# Patient Record
Sex: MALE | Race: WHITE | NOT HISPANIC OR LATINO | Employment: OTHER | RURAL
[De-identification: names, ages, dates, MRNs, and addresses within clinical notes are randomized per-mention and may not be internally consistent; named-entity substitution may affect disease eponyms.]

---

## 2020-06-29 ENCOUNTER — OFFICE VISIT (OUTPATIENT)
Dept: FAMILY MEDICINE CLINIC | Facility: CLINIC | Age: 35
End: 2020-06-29

## 2020-06-29 VITALS
HEIGHT: 73 IN | WEIGHT: 315 LBS | BODY MASS INDEX: 41.75 KG/M2 | OXYGEN SATURATION: 98 % | RESPIRATION RATE: 18 BRPM | TEMPERATURE: 97.5 F | HEART RATE: 86 BPM | DIASTOLIC BLOOD PRESSURE: 100 MMHG | SYSTOLIC BLOOD PRESSURE: 140 MMHG

## 2020-06-29 DIAGNOSIS — I10 ESSENTIAL HYPERTENSION: Primary | ICD-10-CM

## 2020-06-29 DIAGNOSIS — Z13.220 SCREENING FOR HYPERLIPIDEMIA: ICD-10-CM

## 2020-06-29 PROCEDURE — 99203 OFFICE O/P NEW LOW 30 MIN: CPT | Performed by: FAMILY MEDICINE

## 2020-06-29 RX ORDER — LOSARTAN POTASSIUM AND HYDROCHLOROTHIAZIDE 12.5; 5 MG/1; MG/1
1 TABLET ORAL 2 TIMES DAILY
Qty: 60 TABLET | Refills: 3 | Status: SHIPPED | OUTPATIENT
Start: 2020-06-29 | End: 2020-07-27 | Stop reason: SDUPTHER

## 2020-06-29 RX ORDER — LOSARTAN POTASSIUM AND HYDROCHLOROTHIAZIDE 12.5; 5 MG/1; MG/1
1 TABLET ORAL DAILY
COMMUNITY
Start: 2020-06-18 | End: 2020-06-29 | Stop reason: DRUGHIGH

## 2020-06-29 RX ORDER — AZITHROMYCIN 500 MG/1
500 TABLET, FILM COATED ORAL DAILY
COMMUNITY
Start: 2020-06-18 | End: 2020-07-27

## 2020-06-29 NOTE — ASSESSMENT & PLAN NOTE
Hypertension is worsening.  Dietary sodium restriction.  Weight loss.  Regular aerobic exercise.  Medication changes per orders.  Blood pressure will be reassessed in 4 weeks.

## 2020-06-29 NOTE — PROGRESS NOTES
Chief Complaint   Patient presents with   • Hypertension       History of Present Illness:  Subjective   Eliseo Louie is a 34 y.o. male.   Hypertension   This is a new problem. The current episode started more than 1 year ago. The problem is unchanged. The problem is controlled. Associated symptoms include anxiety, headaches and malaise/fatigue. Pertinent negatives include no blurred vision, chest pain, neck pain, orthopnea, palpitations, peripheral edema, PND, shortness of breath or sweats. (Patient states that he was having some migraines for 2 weeks and he states that they was getting to the point they was really bothering him and he states that he went to urgent care and he states that his blood pressure was 185/101 and he states that he was started on Losartan- HCTZ 50- 12.5. He states that after a couple days he started seeing a difference and he states that he has not had headches ever since. He states that this has saeed been an ongoing issue for some time. He states that he had this problem over a year ago and he states that he has gained a bunch of weight and he states that his pressure was getting out of control and he states that he started going to the gym and getting back into shape and he states that his pressure started going down. He states that he stopped taking his medication and he states that he is back at it now and he states that he has gained his weight back. ) There are no associated agents to hypertension. Risk factors for coronary artery disease include family history, male gender, obesity and stress. Past treatments include ACE inhibitors. Current antihypertension treatment includes ACE inhibitors. The current treatment provides mild improvement. There are no compliance problems.  There is no history of angina, kidney disease, CAD/MI, CVA, heart failure, left ventricular hypertrophy, PVD or retinopathy. There is no history of chronic renal disease, coarctation of the aorta,  hyperaldosteronism, hypercortisolism, hyperparathyroidism, a hypertension causing med, pheochromocytoma, renovascular disease, sleep apnea or a thyroid problem.        Allergies:  Allergies   Allergen Reactions   • Augmentin Es-600 [Amoxicillin-Pot Clavulanate] Anaphylaxis   • Ceclor [Cefaclor] Anaphylaxis     Duricef as well      • Duricef [Cefadroxil] Anaphylaxis   • Penicillins Anaphylaxis     Anything in the Cillin group          Social History:  Social History     Socioeconomic History   • Marital status:      Spouse name: Not on file   • Number of children: Not on file   • Years of education: Not on file   • Highest education level: Not on file   Tobacco Use   • Smoking status: Current Every Day Smoker     Types: Electronic Cigarette   • Smokeless tobacco: Never Used   Substance and Sexual Activity   • Alcohol use: Yes     Comment: occasional    • Drug use: Yes     Types: Marijuana     Comment: smokes daily        Family History:  Family History   Problem Relation Age of Onset   • Anxiety disorder Mother    • Depression Mother    • Hypertension Mother    • Hyperlipidemia Mother    • Cancer Mother         ovarian cancer in nremission   • Heart disease Father    • Hypertension Father    • Hyperlipidemia Father    • Cancer Father         lung cancer   • Heart disease Paternal Grandfather        Past Medical History :  Active Ambulatory Problems     Diagnosis Date Noted   • Essential hypertension 06/29/2020     Resolved Ambulatory Problems     Diagnosis Date Noted   • No Resolved Ambulatory Problems     Past Medical History:   Diagnosis Date   • Anxiety    • Hypertension    • Insomnia        Medication List:  Outpatient Encounter Medications as of 6/29/2020   Medication Sig Dispense Refill   • azithromycin (ZITHROMAX) 500 MG tablet Take 500 mg by mouth Daily.     • losartan-hydrochlorothiazide (HYZAAR) 50-12.5 MG per tablet Take 1 tablet by mouth 2 (two) times a day. 60 tablet 3   • [DISCONTINUED]  "losartan-hydrochlorothiazide (HYZAAR) 50-12.5 MG per tablet Take 1 tablet by mouth Daily.       No facility-administered encounter medications on file as of 6/29/2020.        Past Surgical History:  Past Surgical History:   Procedure Laterality Date   • FEMUR FRACTURE SURGERY      Left side Maurilio placed and 4 screws in knee and hip as well        The following portions of the patient's history were reviewed and updated as appropriate: allergies, current medications, past family history, past medical history, past social history, past surgical history and problem list.    Review Of Systems:  Review of Systems   Constitutional: Positive for malaise/fatigue. Negative for activity change, appetite change and fatigue.   HENT: Negative for ear discharge, ear pain, postnasal drip and rhinorrhea.    Eyes: Negative for blurred vision, double vision and discharge.   Respiratory: Negative for cough, chest tightness and shortness of breath.    Cardiovascular: Negative for chest pain, palpitations, orthopnea and PND.   Endocrine: Negative for cold intolerance and heat intolerance.   Musculoskeletal: Negative for back pain, gait problem, joint swelling and neck pain.   Skin: Negative for color change and rash.   Neurological: Negative for dizziness, facial asymmetry and confusion.   Psychiatric/Behavioral: Negative for behavioral problems.       Objective     Physical Exam:  Vital Signs:  Visit Vitals  /100   Pulse 86   Temp 97.5 °F (36.4 °C)   Resp 18   Ht 185.4 cm (73\")   Wt (!) 153 kg (336 lb 12.8 oz)   SpO2 98%   BMI 44.44 kg/m²       Physical Exam   Constitutional: He is oriented to person, place, and time. He appears well-developed and well-nourished.   HENT:   Head: Normocephalic.   Right Ear: External ear normal.   Left Ear: External ear normal.   Nose: Nose normal.   Eyes: Conjunctivae are normal.   Neck: Normal range of motion. Neck supple.   Cardiovascular: Normal rate and regular rhythm.   Pulmonary/Chest: Effort " normal and breath sounds normal.   Musculoskeletal: Normal range of motion.   Neurological: He is alert and oriented to person, place, and time.   Skin: Skin is warm and dry. Capillary refill takes less than 2 seconds.   Vitals reviewed.      Assessment/Plan   Assessment and Plan:  Diagnoses and all orders for this visit:    1. Essential hypertension (Primary)  Assessment & Plan:  Hypertension is worsening.  Dietary sodium restriction.  Weight loss.  Regular aerobic exercise.  Medication changes per orders.  Blood pressure will be reassessed in 4 weeks.    Orders:  -     losartan-hydrochlorothiazide (HYZAAR) 50-12.5 MG per tablet; Take 1 tablet by mouth 2 (two) times a day.  Dispense: 60 tablet; Refill: 3  -     CBC & Differential  -     Comprehensive Metabolic Panel  -     TSH    2. Screening for hyperlipidemia  -     Lipid Panel With / Chol / HDL Ratio

## 2020-06-30 LAB
ALBUMIN SERPL-MCNC: 4.4 G/DL (ref 4–5)
ALBUMIN/GLOB SERPL: 1.7 {RATIO} (ref 1.2–2.2)
ALP SERPL-CCNC: 86 IU/L (ref 39–117)
ALT SERPL-CCNC: 30 IU/L (ref 0–44)
AST SERPL-CCNC: 26 IU/L (ref 0–40)
BASOPHILS # BLD AUTO: 0 X10E3/UL (ref 0–0.2)
BASOPHILS NFR BLD AUTO: 1 %
BILIRUB SERPL-MCNC: 0.2 MG/DL (ref 0–1.2)
BUN SERPL-MCNC: 12 MG/DL (ref 6–20)
BUN/CREAT SERPL: 15 (ref 9–20)
CALCIUM SERPL-MCNC: 9.4 MG/DL (ref 8.7–10.2)
CHLORIDE SERPL-SCNC: 104 MMOL/L (ref 96–106)
CHOLEST SERPL-MCNC: 202 MG/DL (ref 100–199)
CHOLEST/HDLC SERPL: 5.1 RATIO (ref 0–5)
CO2 SERPL-SCNC: 28 MMOL/L (ref 20–29)
CREAT SERPL-MCNC: 0.79 MG/DL (ref 0.76–1.27)
EOSINOPHIL # BLD AUTO: 0.2 X10E3/UL (ref 0–0.4)
EOSINOPHIL NFR BLD AUTO: 4 %
ERYTHROCYTE [DISTWIDTH] IN BLOOD BY AUTOMATED COUNT: 14 % (ref 11.6–15.4)
GLOBULIN SER CALC-MCNC: 2.6 G/DL (ref 1.5–4.5)
GLUCOSE SERPL-MCNC: 97 MG/DL (ref 65–99)
HCT VFR BLD AUTO: 40.1 % (ref 37.5–51)
HDLC SERPL-MCNC: 40 MG/DL
HGB BLD-MCNC: 12.9 G/DL (ref 13–17.7)
IMM GRANULOCYTES # BLD AUTO: 0 X10E3/UL (ref 0–0.1)
IMM GRANULOCYTES NFR BLD AUTO: 0 %
LDLC SERPL CALC-MCNC: 130 MG/DL (ref 0–99)
LYMPHOCYTES # BLD AUTO: 2.1 X10E3/UL (ref 0.7–3.1)
LYMPHOCYTES NFR BLD AUTO: 33 %
MCH RBC QN AUTO: 28.1 PG (ref 26.6–33)
MCHC RBC AUTO-ENTMCNC: 32.2 G/DL (ref 31.5–35.7)
MCV RBC AUTO: 87 FL (ref 79–97)
MONOCYTES # BLD AUTO: 0.4 X10E3/UL (ref 0.1–0.9)
MONOCYTES NFR BLD AUTO: 6 %
NEUTROPHILS # BLD AUTO: 3.7 X10E3/UL (ref 1.4–7)
NEUTROPHILS NFR BLD AUTO: 56 %
PLATELET # BLD AUTO: 234 X10E3/UL (ref 150–450)
POTASSIUM SERPL-SCNC: 4.4 MMOL/L (ref 3.5–5.2)
PROT SERPL-MCNC: 7 G/DL (ref 6–8.5)
RBC # BLD AUTO: 4.59 X10E6/UL (ref 4.14–5.8)
SODIUM SERPL-SCNC: 141 MMOL/L (ref 134–144)
TRIGL SERPL-MCNC: 161 MG/DL (ref 0–149)
TSH SERPL DL<=0.005 MIU/L-ACNC: 2.66 UIU/ML (ref 0.45–4.5)
VLDLC SERPL CALC-MCNC: 32 MG/DL (ref 5–40)
WBC # BLD AUTO: 6.5 X10E3/UL (ref 3.4–10.8)

## 2020-07-27 ENCOUNTER — OFFICE VISIT (OUTPATIENT)
Dept: FAMILY MEDICINE CLINIC | Facility: CLINIC | Age: 35
End: 2020-07-27

## 2020-07-27 VITALS
TEMPERATURE: 98 F | WEIGHT: 315 LBS | BODY MASS INDEX: 41.75 KG/M2 | OXYGEN SATURATION: 98 % | HEART RATE: 91 BPM | RESPIRATION RATE: 18 BRPM | DIASTOLIC BLOOD PRESSURE: 82 MMHG | HEIGHT: 73 IN | SYSTOLIC BLOOD PRESSURE: 128 MMHG

## 2020-07-27 DIAGNOSIS — I10 ESSENTIAL HYPERTENSION: Primary | ICD-10-CM

## 2020-07-27 PROCEDURE — 99213 OFFICE O/P EST LOW 20 MIN: CPT | Performed by: FAMILY MEDICINE

## 2020-07-27 RX ORDER — LOSARTAN POTASSIUM AND HYDROCHLOROTHIAZIDE 12.5; 5 MG/1; MG/1
1 TABLET ORAL 2 TIMES DAILY
Qty: 180 TABLET | Refills: 3 | Status: SHIPPED | OUTPATIENT
Start: 2020-07-27 | End: 2021-10-25

## 2020-07-27 NOTE — PROGRESS NOTES
Chief Complaint   Patient presents with   • Hypertension     follow-up       History of Present Illness:  Subjective   Eliseo Louie is a 34 y.o. male.   Hypertension   This is a new problem. The current episode started more than 1 year ago. The problem is unchanged. The problem is controlled. Associated symptoms include anxiety, headaches and malaise/fatigue. Pertinent negatives include no blurred vision, chest pain, neck pain, orthopnea, palpitations, peripheral edema, PND, shortness of breath or sweats. There are no associated agents to hypertension. Risk factors for coronary artery disease include family history, male gender, obesity and stress. Past treatments include ACE inhibitors. Current antihypertension treatment includes ACE inhibitors. The current treatment provides mild improvement. There are no compliance problems.  There is no history of angina, kidney disease, CAD/MI, CVA, heart failure, left ventricular hypertrophy, PVD or retinopathy. There is no history of chronic renal disease, coarctation of the aorta, hyperaldosteronism, hypercortisolism, hyperparathyroidism, a hypertension causing med, pheochromocytoma, renovascular disease, sleep apnea or a thyroid problem.   7/27/20:  Patient is here today for a follow up for his BP. He has been taking his medication regularly and reports he hasn't had any headaches recently. He denies any complaints or side effects. He was told during his last visit to start taking his medication twice a day. He states he feels like this is working well for him.     Lab Review:   Patient is also here to follow-up on labs that were drawn on 6/29/20.   Recent Results (from the past 1344 hour(s))   CBC & Differential    Collection Time: 06/29/20 10:37 AM   Result Value Ref Range    WBC 6.5 3.4 - 10.8 x10E3/uL    RBC 4.59 4.14 - 5.80 x10E6/uL    Hemoglobin 12.9 (L) 13.0 - 17.7 g/dL    Hematocrit 40.1 37.5 - 51.0 %    MCV 87 79 - 97 fL    MCH 28.1 26.6 - 33.0 pg    MCHC 32.2  31.5 - 35.7 g/dL    RDW 14.0 11.6 - 15.4 %    Platelets 234 150 - 450 x10E3/uL    Neutrophil Rel % 56 Not Estab. %    Lymphocyte Rel % 33 Not Estab. %    Monocyte Rel % 6 Not Estab. %    Eosinophil Rel % 4 Not Estab. %    Basophil Rel % 1 Not Estab. %    Neutrophils Absolute 3.7 1.4 - 7.0 x10E3/uL    Lymphocytes Absolute 2.1 0.7 - 3.1 x10E3/uL    Monocytes Absolute 0.4 0.1 - 0.9 x10E3/uL    Eosinophils Absolute 0.2 0.0 - 0.4 x10E3/uL    Basophils Absolute 0.0 0.0 - 0.2 x10E3/uL    Immature Granulocyte Rel % 0 Not Estab. %    Immature Grans Absolute 0.0 0.0 - 0.1 x10E3/uL   Comprehensive Metabolic Panel    Collection Time: 06/29/20 10:37 AM   Result Value Ref Range    Glucose 97 65 - 99 mg/dL    BUN 12 6 - 20 mg/dL    Creatinine 0.79 0.76 - 1.27 mg/dL    eGFR Non African Am 116 >59 mL/min/1.73    eGFR African Am 134 >59 mL/min/1.73    BUN/Creatinine Ratio 15 9 - 20    Sodium 141 134 - 144 mmol/L    Potassium 4.4 3.5 - 5.2 mmol/L    Chloride 104 96 - 106 mmol/L    Total CO2 28 20 - 29 mmol/L    Calcium 9.4 8.7 - 10.2 mg/dL    Total Protein 7.0 6.0 - 8.5 g/dL    Albumin 4.4 4.0 - 5.0 g/dL    Globulin 2.6 1.5 - 4.5 g/dL    A/G Ratio 1.7 1.2 - 2.2    Total Bilirubin 0.2 0.0 - 1.2 mg/dL    Alkaline Phosphatase 86 39 - 117 IU/L    AST (SGOT) 26 0 - 40 IU/L    ALT (SGPT) 30 0 - 44 IU/L   Lipid Panel With / Chol / HDL Ratio    Collection Time: 06/29/20 10:37 AM   Result Value Ref Range    Total Cholesterol 202 (H) 100 - 199 mg/dL    Triglycerides 161 (H) 0 - 149 mg/dL    HDL Cholesterol 40 >39 mg/dL    VLDL Cholesterol 32 5 - 40 mg/dL    LDL Cholesterol  130 (H) 0 - 99 mg/dL    Chol/HDL Ratio 5.1 (H) 0.0 - 5.0 ratio   TSH    Collection Time: 06/29/20 10:37 AM   Result Value Ref Range    TSH 2.660 0.450 - 4.500 uIU/mL         Allergies:  Allergies   Allergen Reactions   • Augmentin Es-600 [Amoxicillin-Pot Clavulanate] Anaphylaxis   • Ceclor [Cefaclor] Anaphylaxis     Duricef as well      • Duricef [Cefadroxil] Anaphylaxis   •  Penicillins Anaphylaxis     Anything in the Cillin group          Social History:  Social History     Socioeconomic History   • Marital status:      Spouse name: Not on file   • Number of children: Not on file   • Years of education: Not on file   • Highest education level: Not on file   Tobacco Use   • Smoking status: Current Every Day Smoker     Types: Electronic Cigarette   • Smokeless tobacco: Never Used   Substance and Sexual Activity   • Alcohol use: Yes     Comment: occasional    • Drug use: Yes     Types: Marijuana     Comment: smokes daily        Family History:  Family History   Problem Relation Age of Onset   • Anxiety disorder Mother    • Depression Mother    • Hypertension Mother    • Hyperlipidemia Mother    • Cancer Mother         ovarian cancer in nremission   • Heart disease Father    • Hypertension Father    • Hyperlipidemia Father    • Cancer Father         lung cancer   • Heart disease Paternal Grandfather        Past Medical History :  Active Ambulatory Problems     Diagnosis Date Noted   • Essential hypertension 06/29/2020     Resolved Ambulatory Problems     Diagnosis Date Noted   • No Resolved Ambulatory Problems     Past Medical History:   Diagnosis Date   • Anxiety    • Hypertension    • Insomnia        Medication List:  Outpatient Encounter Medications as of 7/27/2020   Medication Sig Dispense Refill   • losartan-hydrochlorothiazide (HYZAAR) 50-12.5 MG per tablet Take 1 tablet by mouth 2 (two) times a day. 180 tablet 3   • [DISCONTINUED] losartan-hydrochlorothiazide (HYZAAR) 50-12.5 MG per tablet Take 1 tablet by mouth 2 (two) times a day. 60 tablet 3   • [DISCONTINUED] azithromycin (ZITHROMAX) 500 MG tablet Take 500 mg by mouth Daily.       No facility-administered encounter medications on file as of 7/27/2020.        Past Surgical History:  Past Surgical History:   Procedure Laterality Date   • FEMUR FRACTURE SURGERY      Left side Maurilio placed and 4 screws in knee and hip as well  "       The following portions of the patient's history were reviewed and updated as appropriate: allergies, current medications, past family history, past medical history, past social history, past surgical history and problem list.    Review Of Systems:  Review of Systems   Constitutional: Positive for malaise/fatigue. Negative for activity change, appetite change and fatigue.   HENT: Negative for ear discharge, ear pain, postnasal drip and rhinorrhea.    Eyes: Negative for blurred vision, double vision and discharge.   Respiratory: Negative for cough, chest tightness and shortness of breath.    Cardiovascular: Negative for chest pain, palpitations, orthopnea and PND.   Endocrine: Negative for cold intolerance and heat intolerance.   Musculoskeletal: Negative for back pain, gait problem, joint swelling and neck pain.   Skin: Negative for color change and rash.   Neurological: Negative for dizziness, facial asymmetry and confusion.   Psychiatric/Behavioral: Negative for behavioral problems.       Objective     Physical Exam:  Vital Signs:  Visit Vitals  /82   Pulse 91   Temp 98 °F (36.7 °C)   Resp 18   Ht 185.4 cm (73\")   Wt (!) 153 kg (337 lb)   SpO2 98%   BMI 44.46 kg/m²       Physical Exam   Constitutional: He is oriented to person, place, and time. He appears well-developed and well-nourished.   HENT:   Head: Normocephalic.   Right Ear: External ear normal.   Left Ear: External ear normal.   Nose: Nose normal.   Eyes: Conjunctivae are normal.   Neck: Normal range of motion. Neck supple.   Cardiovascular: Normal rate and regular rhythm.   Pulmonary/Chest: Effort normal and breath sounds normal.   Musculoskeletal: Normal range of motion.   Neurological: He is alert and oriented to person, place, and time.   Skin: Skin is warm and dry. Capillary refill takes less than 2 seconds.   Vitals reviewed.      Assessment/Plan   Assessment and Plan:  Diagnoses and all orders for this visit:    1. Essential " hypertension (Primary)  Assessment & Plan:  Hypertension is improving with treatment.  Continue current treatment regimen.  Blood pressure will be reassessed 3-4 months..  Refill sent for 90 days with 1 refill.     Orders:  -     losartan-hydrochlorothiazide (HYZAAR) 50-12.5 MG per tablet; Take 1 tablet by mouth 2 (two) times a day.  Dispense: 180 tablet; Refill: 3

## 2020-07-27 NOTE — ASSESSMENT & PLAN NOTE
Hypertension is improving with treatment.  Continue current treatment regimen.  Blood pressure will be reassessed 3-4 months..  Refill was sent for 90 days with 1 refill.

## 2020-09-11 ENCOUNTER — OFFICE VISIT (OUTPATIENT)
Dept: FAMILY MEDICINE CLINIC | Facility: CLINIC | Age: 35
End: 2020-09-11

## 2020-09-11 VITALS
OXYGEN SATURATION: 97 % | TEMPERATURE: 98 F | WEIGHT: 315 LBS | DIASTOLIC BLOOD PRESSURE: 97 MMHG | BODY MASS INDEX: 44.1 KG/M2 | RESPIRATION RATE: 18 BRPM | SYSTOLIC BLOOD PRESSURE: 140 MMHG | HEIGHT: 71 IN | HEART RATE: 90 BPM

## 2020-09-11 DIAGNOSIS — R68.82 DECREASED SEX DRIVE: ICD-10-CM

## 2020-09-11 DIAGNOSIS — F33.0 MILD EPISODE OF RECURRENT MAJOR DEPRESSIVE DISORDER (HCC): Primary | ICD-10-CM

## 2020-09-11 DIAGNOSIS — R53.83 OTHER FATIGUE: ICD-10-CM

## 2020-09-11 PROCEDURE — 99214 OFFICE O/P EST MOD 30 MIN: CPT | Performed by: FAMILY MEDICINE

## 2020-09-11 NOTE — PROGRESS NOTES
"Chief Complaint   Patient presents with   • Depression       History of Present Illness:  Subjective   Eliseo Louie is a 34 y.o. male.   Depression   Visit Type: initial  Onset of symptoms: more than 1 year ago  Progression since onset: gradually worsening  Patient presents with the following symptoms: decreased concentration, depressed mood, excessive worry, fatigue, feelings of hopelessness, feelings of worthlessness, irritability, malaise, nervousness/anxiety, obsessions, thoughts of death and weight gain.  Patient is not experiencing: confusion and shortness of breath.  Frequency of symptoms: constantly   Severity: moderate   Aggravated by: social activities and work stress  Sleep quality: fair  Risk factors: family history and major life event  Treatment tried: nothing       Patient reports he is here at the request of his wife and his mother to discuss his depression. He reports that he has mood swings and lashes out over little things he knows he has no control over. He has recently lost 3 friends in the past year and a half. 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 He plans to take a trip at the end of the month to take some time for himself.   He would like to talk about starting something low dose to help with his depression.     Lack of Desire:   Patient also reports lack of sexual desire. He reports he could care less about that aspect of his relationship and its taking a bit of a toll on his marriage. He also reports feeling \"blah\" and fatigued- even though he sleeps 8 hours a night.     Allergies:  Allergies   Allergen Reactions   • Augmentin Es-600 [Amoxicillin-Pot Clavulanate] Anaphylaxis   • Ceclor [Cefaclor] Anaphylaxis     Duricef as well      • Duricef [Cefadroxil] Anaphylaxis   • Penicillins Anaphylaxis     Anything in the Cillin group          Social History:  Social History "     Socioeconomic History   • Marital status:      Spouse name: Not on file   • Number of children: Not on file   • Years of education: Not on file   • Highest education level: Not on file   Tobacco Use   • Smoking status: Current Every Day Smoker     Types: Electronic Cigarette   • Smokeless tobacco: Never Used   Substance and Sexual Activity   • Alcohol use: Yes     Comment: occasional    • Drug use: Yes     Types: Marijuana     Comment: smokes daily        Family History:  Family History   Problem Relation Age of Onset   • Anxiety disorder Mother    • Depression Mother    • Hypertension Mother    • Hyperlipidemia Mother    • Cancer Mother         ovarian cancer in nremission   • Heart disease Father    • Hypertension Father    • Hyperlipidemia Father    • Cancer Father         lung cancer   • Heart disease Paternal Grandfather        Past Medical History :  Active Ambulatory Problems     Diagnosis Date Noted   • Essential hypertension 06/29/2020   • Mild episode of recurrent major depressive disorder (CMS/HCC) 09/11/2020   • Decreased sex drive 09/11/2020   • Other fatigue 09/11/2020     Resolved Ambulatory Problems     Diagnosis Date Noted   • No Resolved Ambulatory Problems     Past Medical History:   Diagnosis Date   • Anxiety    • Hypertension    • Insomnia        Medication List:  Outpatient Encounter Medications as of 9/11/2020   Medication Sig Dispense Refill   • losartan-hydrochlorothiazide (HYZAAR) 50-12.5 MG per tablet Take 1 tablet by mouth 2 (two) times a day. 180 tablet 3   • Vortioxetine HBr (Trintellix) 10 MG tablet Take 10 mg by mouth Daily. 30 tablet 2     No facility-administered encounter medications on file as of 9/11/2020.        Past Surgical History:  Past Surgical History:   Procedure Laterality Date   • FEMUR FRACTURE SURGERY      Left side Maurilio placed and 4 screws in knee and hip as well        The following portions of the patient's history were reviewed and updated as  "appropriate: allergies, current medications, past family history, past medical history, past social history, past surgical history and problem list.    Review Of Systems:  Review of Systems   Constitutional: Positive for irritability and unexpected weight gain. Negative for activity change, appetite change and fatigue.   HENT: Negative for ear discharge, ear pain, postnasal drip and rhinorrhea.    Eyes: Negative for double vision and discharge.   Respiratory: Negative for cough, chest tightness and shortness of breath.    Cardiovascular: Negative for chest pain.   Endocrine: Negative for cold intolerance and heat intolerance.   Genitourinary: Positive for decreased libido.   Musculoskeletal: Negative for back pain, gait problem and joint swelling.   Skin: Negative for color change and rash.   Neurological: Negative for dizziness, facial asymmetry and confusion.   Psychiatric/Behavioral: Positive for decreased concentration and depressed mood. Negative for behavioral problems. The patient is nervous/anxious.        Objective     Physical Exam:  Vital Signs:  Visit Vitals  /97   Pulse 90   Temp 98 °F (36.7 °C)   Resp 18   Ht 180.3 cm (71\")   Wt (!) 152 kg (335 lb)   SpO2 97%   BMI 46.72 kg/m²       Physical Exam   Constitutional: He is oriented to person, place, and time. He appears well-developed and well-nourished.   HENT:   Head: Normocephalic.   Right Ear: External ear normal.   Left Ear: External ear normal.   Nose: Nose normal.   Eyes: Conjunctivae are normal.   Neck: Normal range of motion. Neck supple.   Cardiovascular: Normal rate and regular rhythm.   Pulmonary/Chest: Effort normal and breath sounds normal.   Musculoskeletal: Normal range of motion.   Neurological: He is alert and oriented to person, place, and time.   Skin: Skin is warm and dry. Capillary refill takes less than 2 seconds.   Vitals reviewed.      Assessment/Plan   Assessment and Plan:  Diagnoses and all orders for this visit:    1. " Mild episode of recurrent major depressive disorder (CMS/HCC) (Primary)  Assessment & Plan:  Psychological condition is worsening.  Continue current treatment regimen.  Psychological condition  will be reassessed in 4 weeks.  Patient was started on Trintellix.    Orders:  -     Vortioxetine HBr (Trintellix) 10 MG tablet; Take 10 mg by mouth Daily.  Dispense: 30 tablet; Refill: 2    2. Decreased sex drive  Assessment & Plan:  Blood work obtained to determine possible cause.    Orders:  -     Testosterone, Free, Total    3. Other fatigue  -     Vitamin D 25 Hydroxy  -     Vitamin B12  -     Folate

## 2020-09-11 NOTE — ASSESSMENT & PLAN NOTE
Psychological condition is worsening.  Continue current treatment regimen.  Psychological condition  will be reassessed in 4 weeks.  Patient was started on Trintellix.

## 2020-09-14 ENCOUNTER — TELEPHONE (OUTPATIENT)
Dept: FAMILY MEDICINE CLINIC | Facility: CLINIC | Age: 35
End: 2020-09-14

## 2020-09-14 NOTE — TELEPHONE ENCOUNTER
Patient called the office and left a voicemail- he went to  his prescription for Trintellix and it was $350.   I have spoke to him and advised that he can call [employer]- Help at hand. And gave him the number.   This program helps people be able to afford Trintellix at a very low cost.     In the event he completes the process and this isn't helpful- to lower the cost- we can talk with Dr Rivera about trying something different.   The benefit of this medication is that it has very low side effects of decreasing sexual desire-which he is already having a problem with.     I asked if he went and got his labs done- which he did. We are still awaiting those results.

## 2020-09-15 LAB
25(OH)D3+25(OH)D2 SERPL-MCNC: 25.4 NG/ML (ref 30–100)
FOLATE SERPL-MCNC: 4 NG/ML
TESTOST FREE SERPL-MCNC: 7 PG/ML (ref 8.7–25.1)
TESTOST SERPL-MCNC: 405 NG/DL (ref 264–916)
VIT B12 SERPL-MCNC: 624 PG/ML (ref 232–1245)

## 2020-09-23 ENCOUNTER — TELEPHONE (OUTPATIENT)
Dept: FAMILY MEDICINE CLINIC | Facility: CLINIC | Age: 35
End: 2020-09-23

## 2020-09-23 NOTE — TELEPHONE ENCOUNTER
Patient called and asked about his medicatin.   Dalila faxed paperwork on 9/16 and left him a voicemail letting him know he would have to contact [employer]- (Help at hand)     I called him today and left him a voicemail letting him know this information- if he needs to, he can call me back.     -Courtney

## 2020-10-12 ENCOUNTER — TELEPHONE (OUTPATIENT)
Dept: FAMILY MEDICINE CLINIC | Facility: CLINIC | Age: 35
End: 2020-10-12

## 2020-11-23 ENCOUNTER — TELEPHONE (OUTPATIENT)
Dept: FAMILY MEDICINE CLINIC | Facility: CLINIC | Age: 35
End: 2020-11-23

## 2021-10-23 DIAGNOSIS — I10 ESSENTIAL HYPERTENSION: ICD-10-CM

## 2021-10-25 RX ORDER — LOSARTAN POTASSIUM AND HYDROCHLOROTHIAZIDE 12.5; 5 MG/1; MG/1
TABLET ORAL
Qty: 180 TABLET | Refills: 3 | Status: SHIPPED | OUTPATIENT
Start: 2021-10-25 | End: 2022-01-24 | Stop reason: SDUPTHER

## 2022-01-24 DIAGNOSIS — I10 ESSENTIAL HYPERTENSION: ICD-10-CM

## 2022-01-24 RX ORDER — LOSARTAN POTASSIUM AND HYDROCHLOROTHIAZIDE 12.5; 5 MG/1; MG/1
1 TABLET ORAL 2 TIMES DAILY
Qty: 180 TABLET | Refills: 3 | Status: SHIPPED | OUTPATIENT
Start: 2022-01-24

## 2022-01-24 NOTE — TELEPHONE ENCOUNTER
Caller: Eliseo Louie    Relationship: Self    Best call back number: [phone]     Requested Prescriptions:   Requested Prescriptions     Pending Prescriptions Disp Refills   • losartan-hydrochlorothiazide (HYZAAR) 50-12.5 MG per tablet 180 tablet 3     Sig: Take 1 tablet by mouth 2 (Two) Times a Day.        Pharmacy where request should be sent: [facility] [address] [place] [address] [id number] [facility] [address]  - [phone] [facility] [phone] FX     Additional details provided by patient: PATIENT IS NO LONGER USING [facility], DUE TO BEING CLOSED FOR UNDERSTAFFING    Does the patient have less than a 3 day supply:  [x] Yes  [] No    Magnolia Alvarez Rep   01/24/22 10:16 EST

## 2023-06-16 DIAGNOSIS — I10 ESSENTIAL HYPERTENSION: ICD-10-CM

## 2023-06-16 RX ORDER — LOSARTAN POTASSIUM AND HYDROCHLOROTHIAZIDE 12.5; 5 MG/1; MG/1
TABLET ORAL
Qty: 180 TABLET | Refills: 0 | OUTPATIENT
Start: 2023-06-16

## 2023-06-16 NOTE — TELEPHONE ENCOUNTER
Dr Rivera is no longer at this office. Please schedule an appointment with new PCP. Advised patient to go to Urgent care or [facility] to be seen before he can get in to see a doctor her.

## 2023-07-17 PROBLEM — F33.0 MILD EPISODE OF RECURRENT MAJOR DEPRESSIVE DISORDER: Status: RESOLVED | Noted: 2020-09-11 | Resolved: 2023-07-17

## 2024-07-27 DIAGNOSIS — I10 ESSENTIAL HYPERTENSION: ICD-10-CM

## 2024-07-29 RX ORDER — LOSARTAN POTASSIUM AND HYDROCHLOROTHIAZIDE 12.5; 5 MG/1; MG/1
1 TABLET ORAL DAILY
Qty: 90 TABLET | Refills: 0 | OUTPATIENT
Start: 2024-07-29

## 2024-08-01 DIAGNOSIS — I10 ESSENTIAL HYPERTENSION: ICD-10-CM

## 2024-08-01 RX ORDER — LOSARTAN POTASSIUM AND HYDROCHLOROTHIAZIDE 12.5; 5 MG/1; MG/1
1 TABLET ORAL DAILY
Qty: 90 TABLET | Refills: 3 | Status: SHIPPED | OUTPATIENT
Start: 2024-08-01

## 2024-08-01 NOTE — TELEPHONE ENCOUNTER
Caller: Eliseo Louie    Relationship: Self    Best call back number:     [phone] (Home)       Requested Prescriptions:   losartan-hydrochlorothiazide (HYZAAR) 50-12.5 MG per tablet        Pharmacy where request should be sent: [facility] [address] [place]  [address] - [phone]  - [phone]      Last office visit with prescribing clinician: 7/17/2023   Last telemedicine visit with prescribing clinician: Visit date not found   Next office visit with prescribing clinician: 8/6/2024     Additional details provided by patient: PATIENT CALLED TO REQUEST A MEDICATION REFILL ON MEDICATION. PATIENT HAS A 2 DAY SUPPLY LEFT.      Does the patient have less than a 3 day supply:  [x] Yes  [] No    Would you like a call back once the refill request has been completed: [] Yes [] No    If the office needs to give you a call back, can they leave a voicemail: [] Yes [] No    [employer] Rep   08/01/24 09:36 EDT         Thanks

## 2024-09-06 ENCOUNTER — OFFICE VISIT (OUTPATIENT)
Dept: FAMILY MEDICINE CLINIC | Facility: CLINIC | Age: 39
End: 2024-09-06
Payer: COMMERCIAL

## 2024-09-06 VITALS
DIASTOLIC BLOOD PRESSURE: 98 MMHG | TEMPERATURE: 97.3 F | BODY MASS INDEX: 44.1 KG/M2 | HEIGHT: 71 IN | WEIGHT: 315 LBS | SYSTOLIC BLOOD PRESSURE: 146 MMHG | RESPIRATION RATE: 16 BRPM | HEART RATE: 97 BPM | OXYGEN SATURATION: 99 %

## 2024-09-06 DIAGNOSIS — T88.7XXA MEDICATION SIDE EFFECT: ICD-10-CM

## 2024-09-06 DIAGNOSIS — Z11.59 ENCOUNTER FOR HEPATITIS C SCREENING TEST FOR LOW RISK PATIENT: ICD-10-CM

## 2024-09-06 DIAGNOSIS — E55.9 VITAMIN D DEFICIENCY: ICD-10-CM

## 2024-09-06 DIAGNOSIS — R06.83 SNORING: ICD-10-CM

## 2024-09-06 DIAGNOSIS — E78.2 MIXED HYPERLIPIDEMIA: ICD-10-CM

## 2024-09-06 DIAGNOSIS — Z00.00 ANNUAL PHYSICAL EXAM: Primary | ICD-10-CM

## 2024-09-06 DIAGNOSIS — Z72.89 CURRENT EVERY DAY VAPING: ICD-10-CM

## 2024-09-06 DIAGNOSIS — I10 ESSENTIAL HYPERTENSION: ICD-10-CM

## 2024-09-06 PROCEDURE — 99395 PREV VISIT EST AGE 18-39: CPT | Performed by: STUDENT IN AN ORGANIZED HEALTH CARE EDUCATION/TRAINING PROGRAM

## 2024-09-06 PROCEDURE — 99214 OFFICE O/P EST MOD 30 MIN: CPT | Performed by: STUDENT IN AN ORGANIZED HEALTH CARE EDUCATION/TRAINING PROGRAM

## 2024-09-06 RX ORDER — LOSARTAN POTASSIUM 100 MG/1
100 TABLET ORAL DAILY
Qty: 30 TABLET | Refills: 1 | Status: SHIPPED | OUTPATIENT
Start: 2024-09-06

## 2024-09-06 NOTE — PROGRESS NOTES
Chief Complaint  Chief Complaint   Patient presents with    Annual Exam    Weight management    Insomnia       Subjective    History of Present Illness        Eliseo Louie presents to [facility] for   Eliseo Louie is a 38 y.o. male here for his annual physical with me. Eliseo is here for coordination of medical care, to discuss health maintenance, disease prevention as well as to followup on medical problems.     Annual Physical Exam  Patient's last Physical Exam was unknown. Patient's medical history, medications and allergy lists were reviewed and updated.  Activity level is minimal.   Exercises 2  to 3 per week.   Appetite is good.   Feels fairly well with few complaints.   Energy level is poor.   Sleeps poorly.   Patient's last colonoscopy was never.   Patient is doing routine self skin exam monthly.   Patient is doing routine self-Testicular exams occasionally  -Offered Tdap, Prevnar 20 vaccines, but pt declines    Current vaping  -Patient states that he quit for 6 months on his own but when stress increased he went back to vaping  -Offered Chantix, nicotine patches but patient states he would like to actually quit on his own again    Eliseo Louie  reports that he has been smoking electronic cigarette. He started smoking about 10 years ago. He has been exposed to tobacco smoke. He has never used smokeless tobacco. I have educated him on the risk of diseases from using tobacco products such as cancer, COPD, and heart disease.     I advised him to quit and he is willing to quit. We have discussed the following method/s for tobacco cessation:  [place].  Together we have set a quit date for  2-3 months .  He will follow up with me in 1 month or sooner to check on his progress.    I spent 5 minutes counseling the patient.       Anxiety and depression screening  -Patient scored an 8 on the depression screening.  He rated himself higher on the scores of fatigue and overeating.   He feels that symptoms of his weight are falsely elevating that score  -Denies issues with depression      Weight management:  -Follow-up from 7/17/2023: made referral to medical bariatrics and Dr. Verma's office.  He states that they contacted him after several weeks and at that time he was trying a new exercise program so he declined to be seen  - Pt would like to discuss weight loss options, interested in GLP medications  - trying to do sugar free options  -States he is currently not doing any exercise at home      Snoring, sleep apnea:  -Pt would like a referral to Dr. Deniz Mcdaniel for sleep study      Hypertension  -Patient currently taking losartan 50 mg/hydrochlorothiazide 12.5 mg daily  -Blood pressure today was 160/90 and on recheck at the end of the appointment was 146/98  -The patient has gained some weight      Family History   Problem Relation Age of Onset    Anxiety disorder Mother     Depression Mother     Hypertension Mother     Hyperlipidemia Mother     Cancer Mother         ovarian cancer in nremission    Heart disease Father     Hypertension Father     Hyperlipidemia Father     Lung cancer Father     Leukemia Maternal Grandfather     Heart disease Paternal Grandfather        Social History     Tobacco Use    Smoking status: Every Day     Types: Electronic Cigarette     Start date: 1/1/2014     Passive exposure: Current    Smokeless tobacco: Never    Tobacco comments:     (2024) quit for 6 months last year but returned when stress increased   Vaping Use    Vaping status: Every Day    Start date: 1/1/2014    Substances: Nicotine, THC, CBD, Flavoring    Devices: Disposable   Substance Use Topics    Alcohol use: Yes     Alcohol/week: 4.0 standard drinks of alcohol     Types: 4 Cans of beer per week     Comment: drinks 2-4 beers once weekly    Drug use: Yes     Types: Marijuana     Comment: smokes daily        Past Surgical History:   Procedure Laterality Date    FEMUR FRACTURE SURGERY Left 2002    Left  "side Maurilio placed and 4 screws in knee and hip as well       Patient Active Problem List   Diagnosis    Essential hypertension    Decreased sex drive    Other fatigue    BMI 45.0-49.9, adult    Vitamin D deficiency    Mixed hyperlipidemia         Current Outpatient Medications:     losartan (COZAAR) 100 MG tablet, Take 1 tablet by mouth Daily., Disp: 30 tablet, Rfl: 1    Objective   /98 (BP Location: Right arm, Patient Position: Sitting, Cuff Size: Large Adult)   Pulse 97   Temp 97.3 °F (36.3 °C) (Skin)   Resp 16   Ht 180.3 cm (71\")   Wt (!) 160 kg (353 lb)   SpO2 99%   BMI 49.23 kg/m²   BP Readings from Last 3 Encounters:   09/06/24 146/98   07/17/23 130/80   09/11/20 140/97     Wt Readings from Last 3 Encounters:   09/06/24 (!) 160 kg (353 lb)   07/17/23 (!) 155 kg (341 lb)   09/11/20 (!) 152 kg (335 lb)     Physical Exam  Constitutional:       General: He is not in acute distress.  HENT:      Head: Normocephalic and atraumatic.      Right Ear: Tympanic membrane normal.      Left Ear: Tympanic membrane normal.      Nose: Nose normal.      Mouth/Throat:      Mouth: Mucous membranes are moist.      Pharynx: Oropharynx is clear. No posterior oropharyngeal erythema.   Eyes:      Extraocular Movements: Extraocular movements intact.      Conjunctiva/sclera: Conjunctivae normal.   Neck:      Comments: - Thyroid not enlarged  Cardiovascular:      Rate and Rhythm: Normal rate and regular rhythm.      Heart sounds: Normal heart sounds.   Pulmonary:      Effort: Pulmonary effort is normal.      Breath sounds: Normal breath sounds. No stridor. No wheezing.   Abdominal:      General: Abdomen is flat. Bowel sounds are normal.      Palpations: Abdomen is soft. There is no mass.      Tenderness: There is no abdominal tenderness. There is no guarding.   Musculoskeletal:         General: No swelling or deformity. Normal range of motion.      Cervical back: Normal range of motion and neck supple.   Skin:     General: Skin " is warm and dry.      Capillary Refill: Capillary refill takes less than 2 seconds.      Coloration: Skin is not jaundiced.      Findings: No rash.   Neurological:      General: No focal deficit present.      Mental Status: He is alert and oriented to person, place, and time.      Cranial Nerves: No cranial nerve deficit.      Motor: No weakness.      Coordination: Coordination normal.      Gait: Gait normal.      Deep Tendon Reflexes: Reflexes normal.   Psychiatric:         Mood and Affect: Mood normal.         Behavior: Behavior normal.         Thought Content: Thought content normal.             Assessment and Plan   Diagnoses and all orders for this visit:    1. Annual physical exam (Primary)  -     CBC & Differential  -     Comprehensive metabolic panel  -     Lipid panel  -     Vitamin D,25-Hydroxy  -     Hemoglobin A1c  -Overall normal 38-year-old male exam.   -  Pertinent screening labs ordered per above.  Patient had normal TSH last year, has not had no personal or family history of thyroid issues    2. Snoring  -     Ambulatory Referral to Sleep Medicine: Dr. Deniz Mcdaniel    3. Essential hypertension  -   Stopping losartan 50 mg/HCTZ 12.5 mg  -Starting losartan (COZAAR) 100 MG tablet; Take 1 tablet by mouth Daily.  Dispense: 30 tablet; Refill: 1    4. Medication side effect  -     CBC & Differential  -     Comprehensive metabolic panel    5. Vitamin D deficiency  -     Vitamin D,25-Hydroxy    6. Mixed hyperlipidemia  -     Lipid panel    7. Encounter for hepatitis C screening test for low risk patient  -     HCV Antibody Rfx To Qnt PCR    Current every day vaping  -Patient wants to quit on his own.  Will check in on his progress next appointment    8. BMI 45.0-49.9, adult  -     Hemoglobin A1c  - Added standardized exercise recommendations to after visit summary  - Added calorie goals, fluid goals, fiber goals, protein goals and recommended Baritastic sanjuana to track food and exercise  - Discussed I would like  him to start tracking first. On follow-up if he is getting close to meeting these goals and is not starting to lose weight we can add a medication.  Did tell him that the GLP-1's would be an out-of-pocket charge  - Discussed that another option would be to talk to medical bariatrics which has more resources and can give him a more intense focus on his lifestyle to help him lose weight.  Patient would like to try the above first           Follow Up   - 1 year for annual physical exam  - 4-5 weeks for weight check, vaping cessation and blood pressure check      The patient was counseled regarding nutrition, physical activity, healthy weight, injury prevention, immunizations and preventative health screenings. Expected course, medications, and adverse effects discussed.  Call or return if worsening or persistent symptoms.  I wore protective equipment throughout this patient encounter including a mask and eye protection.   The complete contents of the Assessment and Plan and Data / Lab Results as documented above have been reviewed and addressed by myself with the patient today as part of an ongoing evaluation / treatment plan.

## 2024-09-06 NOTE — PATIENT INSTRUCTIONS
- to lose weight, you will need to eat 2,700 calories per day or less  - drink 3L of fluid daily  - slowly increase fiber  - eat 150g of protein daily  - download and use [employer] sanjuana on phone for food and exercise tracking                - Work up to 150 minutes of aerobic, moderate intensity (you can talk but you can't sing) or 75 minutes of vigorous activity of dedicated exercise a week  - 2 days a week, include resistance/weight training    Light intensity   - Ex: casual walking, light housework, stretching  Moderate Intensity   - brisk walking, water aerobics, ballroom dancing   - breathing will be a little harder with a faster heartbeat  Vigorous Intensity   - jogging/running, aerobic dancing    What are the benefits of movement?    Moving your body has many benefits. It can:    ?Burn calories, which helps people control their weight  ?Help control blood sugar levels in people with diabetes  ?Lower blood pressure, especially in people with high blood pressure  ?Lower stress and help with depression and anxiety  ?Keep bones strong, so they don't get thin and break easily  ?Lower the chance of dying from heart disease    Adding even small amounts of physical activity to your daily routine can improve your health.    What are the main types of exercise?    There are 3 main types of exercise. They are:    ?Aerobic exercise - Aerobic exercise raises a person's heart rate. Examples of aerobic exercise are walking, running, dancing, riding a bike, or swimming.  ?Resistance training - Resistance training helps make your muscles stronger. People can do this type of exercise using weights, exercise bands, or weight machines. You can also do this type of exercise using your own body weight, as with push-ups, or by lifting items in your home, like jugs of water.   ?Stretching - Stretching exercises help your muscles and joints move more easily.    It's important to have all 3 types of exercise in your exercise program.  That way, your body, muscles, and joints can be as healthy as possible.    Should I talk to my doctor or nurse before I start exercising?    If you have not exercised before or have not exercised in a long time, talk with your doctor or nurse before you start a very active exercise program.  If you have heart disease or risk factors for heart disease (like high blood pressure or diabetes), your doctor or nurse might recommend that you have an exercise test before starting an exercise program.  When you start an exercise program, start slowly. For example, do the exercise at a slow pace or for a few minutes only. Over time, you can exercise faster and for longer periods of time.  What should I do when I exercise? -- Each time you exercise, you should:    ?Warm up - Warming up can help keep you from hurting your muscles when you exercise. To warm up, do a light aerobic exercise (such as walking slowly) or stretch for 5 to 10 minutes.  ?Work out - You should try to get a mix of aerobic exercise, resistance training, and stretching. During an aerobic workout, you can walk fast, swim, run, or use an exercise machine, for example. Other activities, like dancing or playing tennis, are also forms of aerobic exercise. You should also take time to stretch all of your joints, including your neck, shoulders, back, hips, and knees. At least 2 times a week, you can do resistance training exercises as part of your workout.  ?Cool down - Cooling down helps keep you from feeling dizzy after you exercise and helps prevent muscle cramps. To cool down, you can stretch or do a light aerobic exercise for 5 minutes.    Some people go to a gym or do group exercise classes. But you can exercise even without these things. Some exercises can be done even in a small space. You can also try online videos or smartphone apps to get ideas for different types of exercise.     How often should I exercise?     Doctors recommend that people exercise  at least 30 minutes a day, on 5 or more days of the week.  If you can't exercise for 30 minutes straight, try to exercise for 10 minutes at a time, 3 or 4 times a day. Even exercising for shorter amounts of time is good for you, especially if it means spending less time sitting.     When should I call my doctor or nurse? -- If you have any of the following symptoms when you exercise, stop exercising and call your doctor or nurse right away:  ?Pain or pressure in your chest, arms, throat, jaw, or back  ?Nausea or vomiting  ?Feeling like your heart is fluttering or racing very fast  ?Feeling dizzy or faint    What if I don't have time to exercise?     Many people have very busy lives and might not think that they have time to exercise. But it's important to try to find time to exercise, even if you are tired or work a lot. Exercise can increase your energy level, which can make you feel better and might even help you get more work done.  Even if it's hard to set aside a lot of time to exercise, you can still improve your health by moving your body more. There are many ways that you can be more active. For example, you can:    ?Take the stairs instead of the elevator  ?Park in a parking space that is farther away from the door  ?Take a longer route when you walk from one place to another    Spending a lot of time sitting still - for example, watching television or working on the computer - can be bad for your health. Try to get up and move around whenever you can. Even small amounts of movement, like taking short walks, doing household chores, or gardening, can help improve your health. Finding activities you enjoy, or doing them with other people, can help you add more movement into your daily life.    What else should I do when I exercise?     To exercise safely and avoid problems, it's important to:    ?Drink fluids during and after exercising (but avoid drinks with a lot of caffeine or sugar)  ?Avoid exercising  outside if it is too hot or cold  ?Wear layers of clothes, so that you can take them off if you get too hot  ?Wear shoes that fit well and support your feet  ?Be aware of your surroundings if you exercise outside

## 2024-09-12 LAB
25(OH)D3+25(OH)D2 SERPL-MCNC: 17.7 NG/ML (ref 30–100)
ALBUMIN SERPL-MCNC: 4.5 G/DL (ref 4.1–5.1)
ALP SERPL-CCNC: 91 IU/L (ref 44–121)
ALT SERPL-CCNC: 26 IU/L (ref 0–44)
AST SERPL-CCNC: 31 IU/L (ref 0–40)
BASOPHILS # BLD AUTO: 0 X10E3/UL (ref 0–0.2)
BASOPHILS NFR BLD AUTO: 1 %
BILIRUB SERPL-MCNC: 0.4 MG/DL (ref 0–1.2)
BUN SERPL-MCNC: 13 MG/DL (ref 6–20)
BUN/CREAT SERPL: 16 (ref 9–20)
CALCIUM SERPL-MCNC: 9.1 MG/DL (ref 8.7–10.2)
CHLORIDE SERPL-SCNC: 104 MMOL/L (ref 96–106)
CHOLEST SERPL-MCNC: 217 MG/DL (ref 100–199)
CO2 SERPL-SCNC: 24 MMOL/L (ref 20–29)
CREAT SERPL-MCNC: 0.81 MG/DL (ref 0.76–1.27)
EGFRCR SERPLBLD CKD-EPI 2021: 115 ML/MIN/1.73
EOSINOPHIL # BLD AUTO: 0.3 X10E3/UL (ref 0–0.4)
EOSINOPHIL NFR BLD AUTO: 4 %
ERYTHROCYTE [DISTWIDTH] IN BLOOD BY AUTOMATED COUNT: 13.7 % (ref 11.6–15.4)
GLOBULIN SER CALC-MCNC: 2.7 G/DL (ref 1.5–4.5)
GLUCOSE SERPL-MCNC: 94 MG/DL (ref 70–99)
HBA1C MFR BLD: 5.8 % (ref 4.8–5.6)
HCT VFR BLD AUTO: 42.3 % (ref 37.5–51)
HCV AB SERPL QL IA: NORMAL
HCV IGG SERPL QL IA: NON REACTIVE
HDLC SERPL-MCNC: 36 MG/DL
HGB BLD-MCNC: 13.4 G/DL (ref 13–17.7)
IMM GRANULOCYTES # BLD AUTO: 0 X10E3/UL (ref 0–0.1)
IMM GRANULOCYTES NFR BLD AUTO: 0 %
LDLC SERPL CALC-MCNC: 147 MG/DL (ref 0–99)
LYMPHOCYTES # BLD AUTO: 2 X10E3/UL (ref 0.7–3.1)
LYMPHOCYTES NFR BLD AUTO: 33 %
MCH RBC QN AUTO: 27.9 PG (ref 26.6–33)
MCHC RBC AUTO-ENTMCNC: 31.7 G/DL (ref 31.5–35.7)
MCV RBC AUTO: 88 FL (ref 79–97)
MONOCYTES # BLD AUTO: 0.4 X10E3/UL (ref 0.1–0.9)
MONOCYTES NFR BLD AUTO: 7 %
NEUTROPHILS # BLD AUTO: 3.5 X10E3/UL (ref 1.4–7)
NEUTROPHILS NFR BLD AUTO: 55 %
PLATELET # BLD AUTO: 223 X10E3/UL (ref 150–450)
POTASSIUM SERPL-SCNC: 4.3 MMOL/L (ref 3.5–5.2)
PROT SERPL-MCNC: 7.2 G/DL (ref 6–8.5)
RBC # BLD AUTO: 4.8 X10E6/UL (ref 4.14–5.8)
SODIUM SERPL-SCNC: 140 MMOL/L (ref 134–144)
TRIGL SERPL-MCNC: 185 MG/DL (ref 0–149)
VLDLC SERPL CALC-MCNC: 34 MG/DL (ref 5–40)
WBC # BLD AUTO: 6.2 X10E3/UL (ref 3.4–10.8)

## 2024-09-14 PROBLEM — R73.03 PREDIABETES: Status: ACTIVE | Noted: 2024-09-14

## 2024-10-02 NOTE — PROGRESS NOTES
Subjective   Eliseo Louie is a 38 y.o. male.   Chief Complaint   Patient presents with    Obesity    Hypertension    Nicotine Dependence       History of Present Illness     Weight management:  -Follow up from 09/06/2024:   Ordered Hemoglobin A1c.  Given exercise, calorie, fluid, fiber, protein goals.   Recommended Baritastic sanjuana.  Made referral to medical bariatrics Dr. Verma's office prior but he had started a new exercise program and declined to be seen.  Patient was interested in GLP-1 injections    -Patient was keeping a journal of his food for the first 2 weeks then stop tracking  -Has been doing a lot chicken and fish, green vegetables and has been including 1-2 protein bars per day to get his protein requirement (averages about 140 to 160 g)  -Tends to include eggs and sausages for breakfast  -Has had difficulty at times getting down to his calorie goal  -Has cut out a lot of starch from his diet  -Currently exercising 2 to 3 days a week, slowly increasing exercise  - was 352 lbs on last appointment and today is 341 lbs      Essential hypertension  -Follow up from 09/06/2024:   Stopped  Losartan 50 mg/HCTZ 12.5 mg, started Losartan 100 mg  1 tablet daily  - bp today 132/84      Current every day vaping  -Follow up from 09/06/2024: Patient wanted to quit on his own.  Will check in on his progress next appointment  - pt states he's slowing down, feels he's dong about 1/2 as much as he was        The following portions of the patient's history were reviewed and updated as appropriate: allergies, current medications, past family history, past medical history, past social history, past surgical history, and problem list.    Patient Active Problem List   Diagnosis    Essential hypertension    Decreased sex drive    Other fatigue    BMI 45.0-49.9, adult    Vitamin D deficiency    Mixed hyperlipidemia    Current every day vaping    Prediabetes       Current Outpatient Medications on File Prior to Visit   Medication  "Sig Dispense Refill    [DISCONTINUED] losartan (COZAAR) 100 MG tablet Take 1 tablet by mouth Daily. 30 tablet 1     No current facility-administered medications on file prior to visit.     Current outpatient and discharge medications have been reconciled for the patient.  Reviewed by: Carol Dasilva DO      Allergies   Allergen Reactions    Augmentin Es-600 [Amoxicillin-Pot Clavulanate] Anaphylaxis    Ceclor [Cefaclor] Anaphylaxis     Duricef as well       Duricef [Cefadroxil] Anaphylaxis    Penicillins Anaphylaxis     Anything in the Cillin group       Ceftriaxone Other (See Comments)         Objective   Visit Vitals  /84 (BP Location: Right arm, Patient Position: Sitting, Cuff Size: Large Adult)   Pulse 69   Temp 98 °F (36.7 °C) (Temporal)   Resp 18   Ht 177.8 cm (70\")   Wt (!) 155 kg (341 lb 12.8 oz)   SpO2 99%   BMI 49.04 kg/m²       Physical Exam  HENT:      Head: Normocephalic and atraumatic.   Eyes:      Conjunctiva/sclera: Conjunctivae normal.   Neurological:      General: No focal deficit present.      Mental Status: He is alert and oriented to person, place, and time.   Psychiatric:         Mood and Affect: Mood normal.         Behavior: Behavior normal.           Diagnoses and all orders for this visit:    1. BMI 45.0-49.9, adult (Primary)  -Patient is already lost about 12 pounds with current lifestyle changes  -Congratulated him on his weight loss and his hard work, encouraged him to continue and slowly approach his goals to make a more lasting change  -Recommended starting at a slightly higher calorie goal to work toward then slowly bring his calorie goal down as he is able to achieve the higher goals consistently    -Patient asked when we can recheck his labs to check in on his progress, told him like to give it at least another month or so    2. Essential hypertension  - Patient doing well/controlled on current regimen.  Will continue current regimen    -Refilled losartan (COZAAR) 100 MG " tablet; Take 1 tablet by mouth Daily.  Dispense: 90 tablet; Refill: 3    3. Current every day vaping  -Patient reports he is vaping about half of what he was last time.  States is little difficult to quantify his vaping          Follow Up  - 6 weeks weight check    Expected course, medications, and adverse effects discussed as appropriate.  Call or return if worsening or persistent symptoms. Hand hygiene was performed before donning protective equipment and after removal when leaving the room.    This document is intended for medical expert use only. Reading of this document by patients and/or patient's family without participating medical staff guidance may result in misinterpretation and unintended morbidity. Any interpretation of such data is the responsibility of the patient and/or family member responsible for the patient in concert with their primary or specialist providers, not to be left for sources of online searches such as [employer], [employer] or similar queries. Relying on these approaches to knowledge may result in misinterpretation, misguided goals of care and even death should patients or family members try recommendations outside of the realm of professional medical care.

## 2024-10-03 ENCOUNTER — OFFICE VISIT (OUTPATIENT)
Dept: FAMILY MEDICINE CLINIC | Facility: CLINIC | Age: 39
End: 2024-10-03
Payer: COMMERCIAL

## 2024-10-03 VITALS
HEIGHT: 70 IN | BODY MASS INDEX: 45.1 KG/M2 | DIASTOLIC BLOOD PRESSURE: 84 MMHG | RESPIRATION RATE: 18 BRPM | TEMPERATURE: 98 F | WEIGHT: 315 LBS | SYSTOLIC BLOOD PRESSURE: 132 MMHG | HEART RATE: 69 BPM | OXYGEN SATURATION: 99 %

## 2024-10-03 DIAGNOSIS — I10 ESSENTIAL HYPERTENSION: ICD-10-CM

## 2024-10-03 DIAGNOSIS — Z72.89 CURRENT EVERY DAY VAPING: ICD-10-CM

## 2024-10-03 PROCEDURE — 99214 OFFICE O/P EST MOD 30 MIN: CPT | Performed by: STUDENT IN AN ORGANIZED HEALTH CARE EDUCATION/TRAINING PROGRAM

## 2024-10-03 RX ORDER — LOSARTAN POTASSIUM 100 MG/1
100 TABLET ORAL DAILY
Qty: 90 TABLET | Refills: 3 | Status: SHIPPED | OUTPATIENT
Start: 2024-10-03

## 2024-10-28 ENCOUNTER — OFFICE VISIT (OUTPATIENT)
Dept: SLEEP MEDICINE | Facility: CLINIC | Age: 39
End: 2024-10-28
Payer: COMMERCIAL

## 2024-10-28 VITALS
OXYGEN SATURATION: 98 % | DIASTOLIC BLOOD PRESSURE: 89 MMHG | HEIGHT: 71 IN | SYSTOLIC BLOOD PRESSURE: 142 MMHG | HEART RATE: 100 BPM | WEIGHT: 315 LBS | BODY MASS INDEX: 44.1 KG/M2

## 2024-10-28 DIAGNOSIS — E66.01 MORBID OBESITY: ICD-10-CM

## 2024-10-28 DIAGNOSIS — R06.83 SNORING: ICD-10-CM

## 2024-10-28 DIAGNOSIS — G47.30 SLEEP APNEA, UNSPECIFIED TYPE: Primary | ICD-10-CM

## 2024-10-28 DIAGNOSIS — G47.8 NON-RESTORATIVE SLEEP: ICD-10-CM

## 2024-10-28 DIAGNOSIS — G47.10 HYPERSOMNIA: ICD-10-CM

## 2024-10-28 PROCEDURE — 99214 OFFICE O/P EST MOD 30 MIN: CPT | Performed by: FAMILY MEDICINE

## 2024-10-28 PROCEDURE — G0463 HOSPITAL OUTPT CLINIC VISIT: HCPCS

## 2024-10-28 NOTE — PROGRESS NOTES
"[facility]Consultation       Reason for Consultation: Snoring      Patient Care Team:  Carol Dasilva DO as PCP - General (Family Medicine)  Michael Rocha MD as Consulting Physician (Sleep Medicine)      History of present illness:  Thank you for asking me to see your patient.  The patient is a 38 y.o. male presents today with concern for sleep disorder.  No history of prior sleep study or tonsillectomy.  Sleep latency less than 15 minutes; sleeps 5 to 7 hours a night 0-1 naps a day no rotating shifts.  Patient reports hypersomnia nonrestorative sleep weight gain of 85 pounds over the past 5 years snoring witnessed apneas waking up coughing/choking morning headaches waking with dry mouth nocturia up to 1 time a night difficulty staying asleep at night.  BMI 48.2.    Medical Conditions (PMH):   Hypertension  Anxiety    Social history:  Do you drive a commercial vehicle:  No   Shift work:  No   Tobacco use:  Yes e-cigarettes working on quitting  Alcohol use: 2-6 per week  Caffeinated drinks: 2-3 per day  Occupation: Unanswered    Family History (parents and siblings) (pertaining to sleep medicine):  Hypertension  Lung cancer    Allergies:  Augmentin es-600 [amoxicillin-pot clavulanate], Ceclor [cefaclor], Duricef [cefadroxil], Penicillins, and Ceftriaxone       Current Outpatient Medications:     losartan (COZAAR) 100 MG tablet, Take 1 tablet by mouth Daily., Disp: 90 tablet, Rfl: 3    Vital Signs:    Vitals:    10/28/24 0700   BP: 156/94   BP Location: Left arm   Patient Position: Sitting   Pulse: 100   SpO2: 98%   Weight: (!) 157 kg (345 lb 6.4 oz)   Height: 180.3 cm (71\")      Body mass index is 48.17 kg/m².  Neck Circumference: 18.5 inches      REVIEW OF SYSTEMS:  Pertinent positive symptoms are:  Snoring  Witnessed apnea  [place] Sleepiness Scale of Total score: 8   Fatigue  Nasal congestion  Cough  Shortness of breath  Anxiety  Depression      Physical exam:  Vitals:    10/28/24 0700 " "  BP: 156/94   BP Location: Left arm   Patient Position: Sitting   Pulse: 100   SpO2: 98%   Weight: (!) 157 kg (345 lb 6.4 oz)   Height: 180.3 cm (71\")    Body mass index is 48.17 kg/m². Neck Circumference: 18.5 inches  HEENT: Head is atraumatic, normocephalic  Eyes: pupils are round equal and reacting to light and accommodation, conjunctiva normal  Throat: tongue normal  NECK:Neck Circumference: 18.5 inches  RESPIRATORY SYSTEM: Regular respirations  CARDIOVASULAR SYSTEM: Regular rate  EXTREMITES: No cyanosis, clubbing  NEUROLOGICAL SYSTEM: Oriented x 3, no gross motor defects, gait normal      Impression:  1. Sleep apnea, unspecified type    2. Hypersomnia    3. Non-restorative sleep    4. Morbid obesity    5. Snoring        Plan:    Office note(s) from care team reviewed. Office note(s) reviewed: 9/6/2024 PCP    Labs/ Test Results Reviewed:     Most Recent A1C          9/11/2024    09:38   HGBA1C Most Recent   Hemoglobin A1C 5.8    A1c elevated          ASSESSMENT AND PLAN:   Witnessed apnea: patient's symptoms and physical examination are concerning for possible sleep apnea.   I discussed the signs, symptoms, and pathophysiology of sleep apnea with this patient.  I also discussed the potential complications of untreated sleep apnea including but not limited to resistant hypertension, insulin resistance, pulmonary hypertension, atrial fibrillation, heart attack, stroke, nonrestorative sleep with hypersomnia which can increase risk for motor vehicle accidents, etc.   Different testing methods including home-based and lab based sleep studies were discussed with this patient.   Based on patient history and physical examination, will proceed with HST.  The order for the sleep study is placed in [facility].  The test will be scheduled after prior authorization has been obtained through patient's insurance.  Treatment and management will be discussed in more detail with this patient after the test is completed.  All questions " were answered to patient's satisfaction.   Snoring: snoring is the sound created by turbulent airflow vibrating upper airway soft tissue.  I have also discussed factors affecting snoring including sleep deprivation, sleeping on the back and alcohol ingestion. To minimize snoring, patient is advised to have adequate sleep, sleep on their side, and avoid alcohol and sedative medications around bedtime.   Excessive daytime sleepiness:  [place] Sleepiness Scale of Total score: 8.  There are many causes of excessive daytime sleepiness.  Rule out sleep apnea as a contributing factor, as above.  Do not drive, operate heavy machinery, or do activities that require high concentration if feeling tired/drowsy.  Morbid obesity: Body mass index is 48.17 kg/m².. Patients who are overweight or obese are at increased risk of sleep apnea/ sleep disordered breathing. Weight reduction and healthy lifestyle are encouraged in overweight/ obese patients as part of a comprehensive approach to sleep apnea treatment.      I have also discussed with the patient the following  Sleep hygiene: try to maintain a regular bed time and wake time, avoid watching TV/ using electronic devices in bed (including cell phones), limit caffeinated and alcoholic beverages before bed, try to maintain a cool and quiet sleep environment, avoid daytime naps  Adequate amount of sleep: most people need around 7 to 8 hours of sleep each night      Patient will follow-up after study, 31 to 90 days after PAP therapy initiated if applicable, or contact the office sooner for questions or concerns. Patient's questions were answered.      Thank you for allowing me to participate in your patient's care.    Michael Rocha MD  Sleep Medicine  10/28/24  09:19 EDT